# Patient Record
Sex: FEMALE | Race: WHITE
[De-identification: names, ages, dates, MRNs, and addresses within clinical notes are randomized per-mention and may not be internally consistent; named-entity substitution may affect disease eponyms.]

---

## 2022-09-13 ENCOUNTER — APPOINTMENT (OUTPATIENT)
Dept: ORTHOPEDIC SURGERY | Facility: CLINIC | Age: 72
End: 2022-09-13

## 2022-09-13 DIAGNOSIS — E78.00 PURE HYPERCHOLESTEROLEMIA, UNSPECIFIED: ICD-10-CM

## 2022-09-13 PROCEDURE — 72110 X-RAY EXAM L-2 SPINE 4/>VWS: CPT

## 2022-09-13 PROCEDURE — 72170 X-RAY EXAM OF PELVIS: CPT

## 2022-09-13 PROCEDURE — 99204 OFFICE O/P NEW MOD 45 MIN: CPT

## 2022-09-13 NOTE — HISTORY OF PRESENT ILLNESS
[Lower back] : lower back [8] : 8 [Sharp] : sharp [de-identified] : 09/13/2022 - 72 year old  female presents for worsening right-sided lower back pain/clicking X 2 months. Denies specific injury. Reports episodes of locking of the lower back, with  shooting pain down RLE posteriorly to the foot, with tingling. Aggravated by prolonged walking.  Has tried Aleve, with slight relief. Denies prior back surgeries/physical therapy. Denies b/b dysfunction or balance/dexterity issues. \par \par PMH: Non-Hodgkins Lymphoma, DXed 1993 ( in remission)\par  [] : no [FreeTextEntry5] : no reported injury, pt feels clicking in the back. [FreeTextEntry7] : down the right leg

## 2022-09-13 NOTE — IMAGING
[Facet arthropathy] : Facet arthropathy [Disc space narrowing] : Disc space narrowing [Scoliosis] : Scoliosis [AP] : anteroposterior [Mild arthritis (Tonnis Grade 1)] : Mild arthritis (Tonnis Grade 1) [de-identified] : L spine\par \par Palpation: No midline lumbar tenderness. No tenderness to palpation or spasm in bilateral thoracic and lumbar paraspinal musculature. No SI joint tenderness to palpation. \par \par ROM: Full with stiffness\par \par Strength: 5/5 bilateral hip flexors, knee extensors, ankle dorsiflexors, EHL, ankle plantarflexors \par \par Sensation: Sensation present to light touch bilateral L2-S1 distributions \par \par Provocative maneuvers: Negative bilateral straight leg raise\par \par Bilateral hips- \par \par Palpation: No tenderness to palpation over greater trochanter or IT band \par \par ROM: No pain with flexion and internal rotation\par \par

## 2022-09-13 NOTE — ASSESSMENT
[FreeTextEntry1] : RLE radic\par Trial tariq\par Gabapentin- Patient advised of sedating effects, instructed not to drive, operate machinery, or take with other sedating medications. Advised of need to taper on/off medication and risk of abruptly stopping gabapentin.\par MRI to eval degree of stenosis\par PT

## 2022-09-14 ENCOUNTER — FORM ENCOUNTER (OUTPATIENT)
Age: 72
End: 2022-09-14

## 2022-09-15 ENCOUNTER — APPOINTMENT (OUTPATIENT)
Dept: MRI IMAGING | Facility: CLINIC | Age: 72
End: 2022-09-15

## 2022-09-15 PROCEDURE — 72148 MRI LUMBAR SPINE W/O DYE: CPT

## 2022-10-24 ENCOUNTER — APPOINTMENT (OUTPATIENT)
Dept: ORTHOPEDIC SURGERY | Facility: CLINIC | Age: 72
End: 2022-10-24

## 2022-10-24 VITALS — WEIGHT: 130 LBS | BODY MASS INDEX: 26.21 KG/M2 | HEIGHT: 59 IN

## 2022-10-24 PROCEDURE — 99214 OFFICE O/P EST MOD 30 MIN: CPT

## 2022-10-24 NOTE — HISTORY OF PRESENT ILLNESS
[6] : 6 [0] : 0 [de-identified] : L spine MRI 9/15/22-\par \par Findings: There is dextrolevoscoliosis, exaggerated lumbar lordosis, degenerative changes in the visualized \par lower lumbar spine, and loss of disc height with degenerative endplate changes asymmetric on the right at L4-\par L5 and on the left at L5-S1 without acute lumbar vertebral body fracture. There is slight retrolisthesis at T12-L1 \par and L1-L2. The conus appears unremarkable. There is an irregular appearance of the right kidney which also \par appears diminutive in size and may contain multiple cysts. There is no gross paravertebral soft tissue mass.\par L1-L2: There is retrolisthesis, disc bulging, bony ridging, and facet hypertrophy with moderate bilateral \par foraminal narrowing.\par L2-L3: There is mild disc bulging, bony ridging, and facet hypertrophy with mild bilateral foraminal narrowing.\par L3-L4: There is mild disc bulging, bony ridging, facet hypertrophy, and mild left foraminal narrowing.\par L4-L5: There is disc bulging, bony ridging, and facet hypertrophy with mild right foraminal narrowing.\par L5-S1: There is disc bulging, bony ridging, and facet hypertrophy.\par Ind. review- mild R NF narrowing L4/5\par ____________________\par \par \par 09/13/2022 - 72 year old  female presents for worsening right-sided lower back pain/clicking X 2 months. Denies specific injury. Reports episodes of locking of the lower back, with  shooting pain down RLE posteriorly to the foot, with tingling. Aggravated by prolonged walking.  Has tried Aleve, with slight relief. Denies prior back surgeries/physical therapy. Denies b/b dysfunction or balance/dexterity issues. \par \par PMH: Non-Hodgkins Lymphoma, DXed 1993 ( in remission)\par \par 10/24/22- MRI follow up. Intermittent RLE radic to the foot. In PT with some relief. \par  [FreeTextEntry1] : back

## 2022-10-24 NOTE — IMAGING
[de-identified] : L spine\par \par Palpation: No midline lumbar tenderness. No tenderness to palpation or spasm in bilateral thoracic and lumbar paraspinal musculature. No SI joint tenderness to palpation. \par \par ROM: Full with stiffness\par \par Strength: 5/5 bilateral hip flexors, knee extensors, ankle dorsiflexors, EHL, ankle plantarflexors \par \par Sensation: Sensation present to light touch bilateral L2-S1 distributions \par \par Provocative maneuvers: Negative bilateral straight leg raise\par \par Bilateral hips- \par \par Palpation: No tenderness to palpation over greater trochanter or IT band \par \par ROM: No pain with flexion and internal rotation\par \par

## 2022-10-24 NOTE — ASSESSMENT
[FreeTextEntry1] : Weaned off tariq\par C/w PT\par Will discuss Pain\par PCP for renal findings\par

## 2022-11-07 ENCOUNTER — TRANSCRIPTION ENCOUNTER (OUTPATIENT)
Age: 72
End: 2022-11-07

## 2022-12-05 ENCOUNTER — RX RENEWAL (OUTPATIENT)
Age: 72
End: 2022-12-05

## 2022-12-06 ENCOUNTER — APPOINTMENT (OUTPATIENT)
Dept: ORTHOPEDIC SURGERY | Facility: CLINIC | Age: 72
End: 2022-12-06

## 2022-12-06 PROCEDURE — 99214 OFFICE O/P EST MOD 30 MIN: CPT

## 2022-12-06 NOTE — HISTORY OF PRESENT ILLNESS
[5] : 5 [0] : 0 [de-identified] : L spine MRI 9/15/22-\par \par Findings: There is dextrolevoscoliosis, exaggerated lumbar lordosis, degenerative changes in the visualized \par lower lumbar spine, and loss of disc height with degenerative endplate changes asymmetric on the right at L4-\par L5 and on the left at L5-S1 without acute lumbar vertebral body fracture. There is slight retrolisthesis at T12-L1 \par and L1-L2. The conus appears unremarkable. There is an irregular appearance of the right kidney which also \par appears diminutive in size and may contain multiple cysts. There is no gross paravertebral soft tissue mass.\par L1-L2: There is retrolisthesis, disc bulging, bony ridging, and facet hypertrophy with moderate bilateral \par foraminal narrowing.\par L2-L3: There is mild disc bulging, bony ridging, and facet hypertrophy with mild bilateral foraminal narrowing.\par L3-L4: There is mild disc bulging, bony ridging, facet hypertrophy, and mild left foraminal narrowing.\par L4-L5: There is disc bulging, bony ridging, and facet hypertrophy with mild right foraminal narrowing.\par L5-S1: There is disc bulging, bony ridging, and facet hypertrophy.\par Ind. review- mild R NF narrowing L4/5\par ____________________\par \par \par 09/13/2022 - 72 year old  female presents for worsening right-sided lower back pain/clicking X 2 months. Denies specific injury. Reports episodes of locking of the lower back, with  shooting pain down RLE posteriorly to the foot, with tingling. Aggravated by prolonged walking.  Has tried Aleve, with slight relief. Denies prior back surgeries/physical therapy. Denies b/b dysfunction or balance/dexterity issues. \par \par PMH: Non-Hodgkins Lymphoma, DXed 1993 ( in remission)\par \par 10/24/22- MRI follow up. Intermittent RLE radic to the foot. In PT with some relief. \par 12/6/22- in PT with some overall relief. Less pain down the RLE at this time. Still R LBP.  [FreeTextEntry1] : back [de-identified] : PT

## 2022-12-06 NOTE — IMAGING
[de-identified] : L spine\par \par Palpation: No midline lumbar tenderness. No tenderness to palpation or spasm in bilateral thoracic and lumbar paraspinal musculature. No SI joint tenderness to palpation. \par \par ROM: Full with stiffness\par \par Strength: 5/5 bilateral hip flexors, knee extensors, ankle dorsiflexors, EHL, ankle plantarflexors \par \par Sensation: Sensation present to light touch bilateral L2-S1 distributions \par \par Provocative maneuvers: Negative bilateral straight leg raise\par \par Bilateral hips- \par \par Palpation: No tenderness to palpation over greater trochanter or IT band \par \par ROM: No pain with flexion and internal rotation\par \par

## 2022-12-06 NOTE — ASSESSMENT
[FreeTextEntry1] : Gabapentin- Patient advised of sedating effects, instructed not to drive, operate machinery, or take with other sedating medications. Advised of need to taper on/off medication and risk of abruptly stopping gabapentin. \par \par C/w PT\par Will discuss Pain\par PCP for renal findings\par

## 2023-02-14 ENCOUNTER — APPOINTMENT (OUTPATIENT)
Dept: ORTHOPEDIC SURGERY | Facility: CLINIC | Age: 73
End: 2023-02-14
Payer: MEDICARE

## 2023-02-14 PROCEDURE — 99214 OFFICE O/P EST MOD 30 MIN: CPT

## 2023-02-14 NOTE — IMAGING
[de-identified] : L spine\par \par Palpation: No midline lumbar tenderness. Right lumbar paraspinal tenderness. No SI joint tenderness to palpation. \par \par ROM: Full with stiffness\par \par Strength: 5/5 bilateral hip flexors, knee extensors, ankle dorsiflexors, EHL, ankle plantarflexors \par \par Sensation: Sensation present to light touch bilateral L2-S1 distributions \par \par Provocative maneuvers: +R straight leg raise, -L straight leg raise\par \par Bilateral hips- \par \par Palpation: No tenderness to palpation over greater trochanter or IT band \par \par ROM: No pain with flexion and internal rotation\par \par

## 2023-02-14 NOTE — ASSESSMENT
[FreeTextEntry1] : Gabapentin- Patient advised of sedating effects, instructed not to drive, operate machinery, or take with other sedating medications. Advised of need to taper on/off medication and risk of abruptly stopping gabapentin. \par \par C/w PT\par Will discuss Pain\par Seen by PCP for renal findings\par \par Patient seen by Rika Conley PA-C,  under the supervision of  Dr. Ryder Silva M.D.\par \par \par \par

## 2023-02-14 NOTE — HISTORY OF PRESENT ILLNESS
[0] : 0 [4] : 4 [de-identified] : L spine MRI 9/15/22-\par \par Findings: There is dextrolevoscoliosis, exaggerated lumbar lordosis, degenerative changes in the visualized \par lower lumbar spine, and loss of disc height with degenerative endplate changes asymmetric on the right at L4-\par L5 and on the left at L5-S1 without acute lumbar vertebral body fracture. There is slight retrolisthesis at T12-L1 \par and L1-L2. The conus appears unremarkable. There is an irregular appearance of the right kidney which also \par appears diminutive in size and may contain multiple cysts. There is no gross paravertebral soft tissue mass.\par L1-L2: There is retrolisthesis, disc bulging, bony ridging, and facet hypertrophy with moderate bilateral \par foraminal narrowing.\par L2-L3: There is mild disc bulging, bony ridging, and facet hypertrophy with mild bilateral foraminal narrowing.\par L3-L4: There is mild disc bulging, bony ridging, facet hypertrophy, and mild left foraminal narrowing.\par L4-L5: There is disc bulging, bony ridging, and facet hypertrophy with mild right foraminal narrowing.\par L5-S1: There is disc bulging, bony ridging, and facet hypertrophy.\par Ind. review- mild R NF narrowing L4/5\par ____________________\par \par \par 09/13/2022 - 72 year old  female presents for worsening right-sided lower back pain/clicking X 2 months. Denies specific injury. Reports episodes of locking of the lower back, with  shooting pain down RLE posteriorly to the foot, with tingling. Aggravated by prolonged walking.  Has tried Aleve, with slight relief. Denies prior back surgeries/physical therapy. Denies b/b dysfunction or balance/dexterity issues. \par \par PMH: Non-Hodgkins Lymphoma, DXed 1993 ( in remission)\par \par 10/24/22- MRI follow up. Intermittent RLE radic to the foot. In PT with some relief. \par 12/6/22- in PT with some overall relief. Less pain down the RLE at this time. Still R LBP. \par 2/14/23- In PT. Pain the same. Radiation down RLE above the knee anteriorly, with N/T. \par \par  [FreeTextEntry1] : back [de-identified] : PT

## 2023-03-27 ENCOUNTER — APPOINTMENT (OUTPATIENT)
Dept: ORTHOPEDIC SURGERY | Facility: CLINIC | Age: 73
End: 2023-03-27
Payer: MEDICARE

## 2023-03-27 PROCEDURE — 99214 OFFICE O/P EST MOD 30 MIN: CPT

## 2023-03-27 NOTE — HISTORY OF PRESENT ILLNESS
[4] : 4 [0] : 0 [de-identified] : L spine MRI 9/15/22-\par \par Findings: There is dextrolevoscoliosis, exaggerated lumbar lordosis, degenerative changes in the visualized \par lower lumbar spine, and loss of disc height with degenerative endplate changes asymmetric on the right at L4-\par L5 and on the left at L5-S1 without acute lumbar vertebral body fracture. There is slight retrolisthesis at T12-L1 \par and L1-L2. The conus appears unremarkable. There is an irregular appearance of the right kidney which also \par appears diminutive in size and may contain multiple cysts. There is no gross paravertebral soft tissue mass.\par L1-L2: There is retrolisthesis, disc bulging, bony ridging, and facet hypertrophy with moderate bilateral \par foraminal narrowing.\par L2-L3: There is mild disc bulging, bony ridging, and facet hypertrophy with mild bilateral foraminal narrowing.\par L3-L4: There is mild disc bulging, bony ridging, facet hypertrophy, and mild left foraminal narrowing.\par L4-L5: There is disc bulging, bony ridging, and facet hypertrophy with mild right foraminal narrowing.\par L5-S1: There is disc bulging, bony ridging, and facet hypertrophy.\par Ind. review- mild R NF narrowing L4/5\par ____________________\par \par \par 09/13/2022 - 72 year old  female presents for worsening right-sided lower back pain/clicking X 2 months. Denies specific injury. Reports episodes of locking of the lower back, with  shooting pain down RLE posteriorly to the foot, with tingling. Aggravated by prolonged walking.  Has tried Aleve, with slight relief. Denies prior back surgeries/physical therapy. Denies b/b dysfunction or balance/dexterity issues. \par \par PMH: Non-Hodgkins Lymphoma, DXed 1993 ( in remission)\par \par 10/24/22- MRI follow up. Intermittent RLE radic to the foot. In PT with some relief. \par 12/6/22- in PT with some overall relief. Less pain down the RLE at this time. Still R LBP. \par 2/14/23- In PT. Pain the same. Radiation down RLE above the knee anteriorly, with N/T. \par 3/27/23- Finished PT. Still intermittent pain in to the RLE thigh, no longer to the foot\par \par  [FreeTextEntry1] : back [de-identified] : none

## 2023-03-27 NOTE — IMAGING
[de-identified] : L spine\par \par Palpation: No midline lumbar tenderness. Right lumbar paraspinal tenderness. No SI joint tenderness to palpation. \par \par ROM: Full with stiffness\par \par Strength: 5/5 bilateral hip flexors, knee extensors, ankle dorsiflexors, EHL, ankle plantarflexors \par \par Sensation: Sensation present to light touch bilateral L2-S1 distributions \par \par Provocative maneuvers: -R straight leg raise, -L straight leg raise\par \par Bilateral hips- \par \par Palpation: No tenderness to palpation over greater trochanter or IT band \par \par ROM: No pain with flexion and internal rotation\par \par

## 2023-03-27 NOTE — ASSESSMENT
[FreeTextEntry1] : Gabapentin- Patient advised of sedating effects, instructed not to drive, operate machinery, or take with other sedating medications. Advised of need to taper on/off medication and risk of abruptly stopping gabapentin. \par \par C/w HEP\par Will discuss Pain\par Seen by PCP for renal findings\par \par \par \par \par

## 2023-06-06 ENCOUNTER — APPOINTMENT (OUTPATIENT)
Dept: ORTHOPEDIC SURGERY | Facility: CLINIC | Age: 73
End: 2023-06-06
Payer: MEDICARE

## 2023-06-06 DIAGNOSIS — M41.50 OTHER SECONDARY SCOLIOSIS, SITE UNSPECIFIED: ICD-10-CM

## 2023-06-06 DIAGNOSIS — M51.36 OTHER INTERVERTEBRAL DISC DEGENERATION, LUMBAR REGION: ICD-10-CM

## 2023-06-06 DIAGNOSIS — M54.16 RADICULOPATHY, LUMBAR REGION: ICD-10-CM

## 2023-06-06 PROCEDURE — 99213 OFFICE O/P EST LOW 20 MIN: CPT

## 2023-06-06 NOTE — IMAGING
[de-identified] : L spine\par \par Palpation: No midline lumbar tenderness. Right lumbar paraspinal tenderness. No SI joint tenderness to palpation. \par \par ROM: Full with stiffness\par \par Strength: 5/5 bilateral hip flexors, knee extensors, ankle dorsiflexors, EHL, ankle plantarflexors \par \par Sensation: Sensation present to light touch bilateral L2-S1 distributions \par \par Provocative maneuvers: -R straight leg raise, -L straight leg raise\par \par Bilateral hips- \par \par Palpation: No tenderness to palpation over greater trochanter or IT band \par \par ROM: No pain with flexion and internal rotation\par \par

## 2023-06-06 NOTE — HISTORY OF PRESENT ILLNESS
[4] : 4 [0] : 0 [de-identified] : L spine MRI 9/15/22-\par \par Findings: There is dextrolevoscoliosis, exaggerated lumbar lordosis, degenerative changes in the visualized \par lower lumbar spine, and loss of disc height with degenerative endplate changes asymmetric on the right at L4-\par L5 and on the left at L5-S1 without acute lumbar vertebral body fracture. There is slight retrolisthesis at T12-L1 \par and L1-L2. The conus appears unremarkable. There is an irregular appearance of the right kidney which also \par appears diminutive in size and may contain multiple cysts. There is no gross paravertebral soft tissue mass.\par L1-L2: There is retrolisthesis, disc bulging, bony ridging, and facet hypertrophy with moderate bilateral \par foraminal narrowing.\par L2-L3: There is mild disc bulging, bony ridging, and facet hypertrophy with mild bilateral foraminal narrowing.\par L3-L4: There is mild disc bulging, bony ridging, facet hypertrophy, and mild left foraminal narrowing.\par L4-L5: There is disc bulging, bony ridging, and facet hypertrophy with mild right foraminal narrowing.\par L5-S1: There is disc bulging, bony ridging, and facet hypertrophy.\par Ind. review- mild R NF narrowing L4/5\par ____________________\par \par \par 09/13/2022 - 72 year old  female presents for worsening right-sided lower back pain/clicking X 2 months. Denies specific injury. Reports episodes of locking of the lower back, with  shooting pain down RLE posteriorly to the foot, with tingling. Aggravated by prolonged walking.  Has tried Aleve, with slight relief. Denies prior back surgeries/physical therapy. Denies b/b dysfunction or balance/dexterity issues. \par \par PMH: Non-Hodgkins Lymphoma, DXed 1993 ( in remission)\par \par 10/24/22- MRI follow up. Intermittent RLE radic to the foot. In PT with some relief. \par 12/6/22- in PT with some overall relief. Less pain down the RLE at this time. Still R LBP. \par 2/14/23- In PT. Pain the same. Radiation down RLE above the knee anteriorly, with N/T. \par 3/27/23- Finished PT. Still intermittent pain in to the RLE thigh, no longer to the foot\par 6/6/23- LBP generally improving, although aggravated by recent car ride to [place]. Less radiation down RLE, although does have some episodes of buckling. \par \par  [FreeTextEntry1] : back [de-identified] : none

## 2023-06-06 NOTE — ASSESSMENT
[FreeTextEntry1] : Gabapentin- Patient advised of sedating effects, instructed not to drive, operate machinery, or take with other sedating medications. Advised of need to taper on/off medication and risk of abruptly stopping gabapentin. \par \par C/w HEP\par Discussed pain, declines for now\par \par Patient seen by Rika Conley PA-C, with and under the supervision of  Dr. Ryder Silva M.D.\par \par \par \par \par \par

## 2023-07-18 ENCOUNTER — APPOINTMENT (OUTPATIENT)
Dept: ORTHOPEDIC SURGERY | Facility: CLINIC | Age: 73
End: 2023-07-18

## 2023-08-03 RX ORDER — GABAPENTIN 100 MG/1
100 CAPSULE ORAL
Qty: 60 | Refills: 2 | Status: ACTIVE | COMMUNITY
Start: 2022-09-13 | End: 1900-01-01

## 2023-08-08 ENCOUNTER — TRANSCRIPTION ENCOUNTER (OUTPATIENT)
Age: 73
End: 2023-08-08

## 2024-08-02 ENCOUNTER — OFFICE (OUTPATIENT)
Facility: LOCATION | Age: 74
Setting detail: OPHTHALMOLOGY
End: 2024-08-02
Payer: COMMERCIAL

## 2024-08-02 DIAGNOSIS — H35.033: ICD-10-CM

## 2024-08-02 DIAGNOSIS — H25.013: ICD-10-CM

## 2024-08-02 DIAGNOSIS — H43.393: ICD-10-CM

## 2024-08-02 DIAGNOSIS — H52.4: ICD-10-CM

## 2024-08-02 PROCEDURE — 92250 FUNDUS PHOTOGRAPHY W/I&R: CPT | Performed by: OPHTHALMOLOGY

## 2024-08-02 PROCEDURE — 92015 DETERMINE REFRACTIVE STATE: CPT | Performed by: OPHTHALMOLOGY

## 2024-08-02 PROCEDURE — 92004 COMPRE OPH EXAM NEW PT 1/>: CPT | Performed by: OPHTHALMOLOGY

## 2024-08-02 ASSESSMENT — CONFRONTATIONAL VISUAL FIELD TEST (CVF)
OS_FINDINGS: FULL
OD_FINDINGS: FULL